# Patient Record
Sex: FEMALE | Race: WHITE | NOT HISPANIC OR LATINO | Employment: FULL TIME | URBAN - METROPOLITAN AREA
[De-identification: names, ages, dates, MRNs, and addresses within clinical notes are randomized per-mention and may not be internally consistent; named-entity substitution may affect disease eponyms.]

---

## 2023-07-07 DIAGNOSIS — N63.20 MASS OF LEFT BREAST, UNSPECIFIED QUADRANT: Primary | ICD-10-CM

## 2023-07-07 PROBLEM — N64.4 BREAST PAIN: Status: ACTIVE | Noted: 2023-07-07

## 2023-07-07 NOTE — PROGRESS NOTES
[facility]  Fred Cline MD, FACS  Zach Magana NP-C      Chief Complaint:   Deidra Mckeon is a 53 y.o. female presenting today for her annual evaluation.  She is due for mammogram. She reports no interval changes.     History of Present Illness:   Mrs. Mckeon first presented on May 15, 2017  with a left breast lump that on imaging was found to be benign and the recommendation was made to follow the area conservatively.   She was lost to follow up until September 2022 whent she returns with a left breast lump.  Imaging has been normal.  MD::: Montana Valderrama MD    Past Medical History:   Diagnosis Date    Acid reflux disease     Amenorrhea, unspecified     Breast pain 7/7/2023    Hiatal hernia     Hypothyroidism, unspecified     Joint disorder     Kidney stones     Mass of left breast 7/7/2023      Past Surgical History:   Procedure Laterality Date    DILATION AND CURETTAGE OF UTERUS      LITHOTRIPSY      TONSILLECTOMY AND ADENOIDECTOMY          Current Outpatient Medications:     ARMOUR THYROID 60 mg Tab, Take 60 mg by mouth every morning., Disp: , Rfl:     baclofen (LIORESAL) 10 MG tablet, Take 10 mg by mouth 2 (two) times daily., Disp: , Rfl:     EScitalopram oxalate (LEXAPRO) 10 MG tablet, Take 1 tablet (10 mg total) by mouth once daily., Disp: 90 tablet, Rfl: 3    ondansetron (ZOFRAN-ODT) 8 MG TbDL, DISSOLVE 1 TABLET ON THE TONGUE EVERY 12 HOURS AS NEEDED FOR NAUSEA OR VOMITING, Disp: , Rfl:     RABEprazole (ACIPHEX) 20 mg tablet, , Disp: , Rfl:    Review of patient's allergies indicates:   Allergen Reactions    Latex Hives    Latex, natural rubber     Penicillin     Penicillins       Social History     Tobacco Use    Smoking status: Never    Smokeless tobacco: Never   Substance Use Topics    Alcohol use: Never      Family History   Family history unknown: Yes        Review of Systems   Integumentary:  Negative for color change, rash, mole/lesion, breast  mass, breast discharge and breast tenderness.   Breast: Negative for mass and tenderness     Physical Exam   HENT:   Head: Normocephalic.   Pulmonary/Chest: Right breast exhibits no inverted nipple, no mass, no nipple discharge, no skin change and no tenderness. Left breast exhibits no inverted nipple, no mass, no nipple discharge, no skin change and no tenderness. No breast swelling.   Genitourinary: No breast swelling.   Musculoskeletal: Lymphadenopathy:      Upper Body:      Right upper body: No supraclavicular or axillary adenopathy.      Left upper body: No supraclavicular or axillary adenopathy.     Neurological: She is alert.      MAMMOGRAM REPORT: She has some diffuse fibronodular tissue; there are no spiculated lesions, distortions or suspicious calcifications noted; NEM    ASSESSMENT and PLAN OF CARE     1. Mass of left breast, unspecified quadrant  Assessment & Plan:  We reviewed our findings today and her questions were answered.  She understands that her imaging and exams have remained stable (and show nothing concerning).  She is comfortable being followed in a conservative fashion.      She understands the importance of monthly self-breast examination and knows to report any and all changes as they occur.    NOTE:::We viewed her films together at today's visit.  We discussed the multiple views obtained and the important findings.  Even benign changes were mentioned and her questions were answered.  She knows that she may receive a formal letter or report from the Radiologist.  She is to contact us if she has questions.        2. Breast pain  Assessment & Plan:  We discussed our fibrocystic mastopathy protocol in detail. She knows that if she follows this protocol - that her symptoms should improve.  We discussed how breast pain is usually not associated with breast cancer, however, pain can be the presenting symptom with some cancers (but this could be coincidental). Still, if her pain does not  improve in 8-12 weeks she should call us back for additional recommendations.        Medical Decision Making:  It is my impression that this patient suffers all conditions contained in this medical document.  Each of these conditions did affect our plan of care and my medical decision making today.  It is my opinion that the medical decision making concerning this patient was of minimal  difficulty based on the aforementioned conditions.  Any further recommendations will be communicated to the patient by me.  I have reviewed and verified her allergies, list of medications, medical and surgical histories, social history, and a pertinent review of symptoms.     Follow up: 1 year and PRN    For: PE and OPAL (D) at [facility]

## 2023-07-19 ENCOUNTER — OFFICE VISIT (OUTPATIENT)
Dept: SURGERY | Facility: CLINIC | Age: 53
End: 2023-07-19
Payer: COMMERCIAL

## 2023-07-19 DIAGNOSIS — N63.20 MASS OF LEFT BREAST, UNSPECIFIED QUADRANT: Primary | ICD-10-CM

## 2023-07-19 DIAGNOSIS — N64.4 BREAST PAIN: ICD-10-CM

## 2023-07-19 PROCEDURE — 1160F PR REVIEW ALL MEDS BY PRESCRIBER/CLIN PHARMACIST DOCUMENTED: ICD-10-PCS | Mod: CPTII,S$GLB,, | Performed by: SPECIALIST

## 2023-07-19 PROCEDURE — 1159F PR MEDICATION LIST DOCUMENTED IN MEDICAL RECORD: ICD-10-PCS | Mod: CPTII,S$GLB,, | Performed by: SPECIALIST

## 2023-07-19 PROCEDURE — 99213 OFFICE O/P EST LOW 20 MIN: CPT | Mod: S$GLB,,, | Performed by: SPECIALIST

## 2023-07-19 PROCEDURE — 1160F RVW MEDS BY RX/DR IN RCRD: CPT | Mod: CPTII,S$GLB,, | Performed by: SPECIALIST

## 2023-07-19 PROCEDURE — 1159F MED LIST DOCD IN RCRD: CPT | Mod: CPTII,S$GLB,, | Performed by: SPECIALIST

## 2023-07-19 PROCEDURE — 99213 PR OFFICE/OUTPT VISIT, EST, LEVL III, 20-29 MIN: ICD-10-PCS | Mod: S$GLB,,, | Performed by: SPECIALIST

## 2024-07-03 DIAGNOSIS — N63.20 MASS OF LEFT BREAST, UNSPECIFIED QUADRANT: Primary | ICD-10-CM

## 2024-07-03 NOTE — PROGRESS NOTES
Date of Service: 7/24/2024      Chief Complaint:   Deidra Mckeon is a 54 y.o. female presenting today for her annual evaluation.  She is due for a mammogram. She reports no interval changes.     History of Present Illness: Mrs. Mckeon first presented on May 15, 2017  with a left breast lump that on imaging was found to be benign and the recommendation was made to follow the area conservatively.   She was lost to follow up until September 2022 whent she returns with a left breast lump.  Imaging has been normal.  MD::: Montana Valderrama MD    Past Medical History:   Diagnosis Date    Acid reflux disease     Amenorrhea, unspecified     Breast pain 7/7/2023    Hiatal hernia     Hypothyroidism, unspecified     Joint disorder     Kidney stones     Mass of left breast 7/7/2023      Past Surgical History:   Procedure Laterality Date    DILATION AND CURETTAGE OF UTERUS      LITHOTRIPSY      TONSILLECTOMY AND ADENOIDECTOMY          Current Outpatient Medications:     ARMOUR THYROID 60 mg Tab, Take 60 mg by mouth every morning., Disp: , Rfl:     baclofen (LIORESAL) 10 MG tablet, Take 10 mg by mouth 2 (two) times daily., Disp: , Rfl:     EScitalopram oxalate (LEXAPRO) 10 MG tablet, TAKE 1 TABLET(10 MG) BY MOUTH EVERY DAY, Disp: 90 tablet, Rfl: 3    estradioL (ESTRACE) 0.5 MG tablet, Take 2 tablets (1 mg total) by mouth once daily., Disp: 60 tablet, Rfl: 11    ondansetron (ZOFRAN-ODT) 8 MG TbDL, DISSOLVE 1 TABLET ON THE TONGUE EVERY 12 HOURS AS NEEDED FOR NAUSEA OR VOMITING, Disp: 20 tablet, Rfl: 3    progesterone (PROMETRIUM) 100 MG capsule, Take 2 capsules (200 mg total) by mouth once daily., Disp: 60 capsule, Rfl: 11    RABEprazole (ACIPHEX) 20 mg tablet, , Disp: , Rfl:    Review of patient's allergies indicates:   Allergen Reactions    Latex Hives    Penicillin       Social History     Tobacco Use    Smoking status: Never    Smokeless tobacco: Never   Substance Use Topics    Alcohol use: Never      Family  History   Family history unknown: Yes        Review of Systems   Integumentary:  Negative for color change, rash, mole/lesion, thickening/swelling, dimpling of skin, drainage  Breast: Negative for mass and tenderness     Physical Exam   Constitutional: She appears well-developed. She is cooperative.   HENT: Normocephalic.   Cardiovascular:  Normal rate and regular rhythm.            Pulmonary/Chest: She exhibits no tenderness and no bony tenderness.   Abdominal: Soft. Normal appearance.   Musculoskeletal: Intact with no deficits  Neurological: She is alert.   Skin: No rash noted.   Breast and Chest Wall Evaluation:   Right breast exhibits no mass, no nipple discharge, no skin change and no tenderness.     Left breast exhibits no mass, no nipple discharge, no skin change and no tenderness.     Lymphadenopathy: No supraclavicular or axillary adenopathy.    MAMMOGRAM REPORT: She has some diffuse fibronodular tissue; there are no spiculated lesions, distortions or suspicious calcifications noted; NEM    ASSESSMENT and PLAN OF CARE     1. Mass of left breast, unspecified quadrant  Assessment & Plan:  We reviewed our findings today and her questions were answered.  She understands that her imaging and exams have remained stable (and show nothing concerning).  She is comfortable being followed in a conservative fashion.      She understands the importance of monthly self-breast examination and knows to report any and all changes as they occur.    NOTE:::We viewed her films together at today's visit.  We discussed the multiple views obtained and the important findings.  Even benign changes were mentioned and her questions were answered.  She knows that she may receive a formal letter or report from the Radiologist.  She is to contact us if she has questions.         2. Breast pain  Assessment & Plan:  We discussed our fibrocystic mastopathy protocol in detail. She knows that if she follows this protocol - that her symptoms  should improve.  We discussed how breast pain is usually not associated with breast cancer, however, pain can be the presenting symptom with some cancers (but this could be coincidental). Still, if her pain does not improve in 8-12 weeks she should call us back for additional recommendations.       Medical Decision Making: It is my impression that this patient suffers all conditions contained in this medical document.  Each of these conditions did affect our plan of care and my medical decision making today.  It is my opinion that the medical decision making concerning this patient was of minimal  difficulty based on the aforementioned conditions.  Any further recommendations will be communicated to the patient by me.  I have reviewed and verified her allergies, list of medications, medical and surgical histories, social history, and a pertinent review of symptoms.     Follow up: 1 year and PRN    For: Physical Examination and MGM (D) at [facility]

## 2024-07-24 ENCOUNTER — OFFICE VISIT (OUTPATIENT)
Dept: SURGERY | Facility: CLINIC | Age: 54
End: 2024-07-24
Payer: COMMERCIAL

## 2024-07-24 DIAGNOSIS — N63.20 MASS OF LEFT BREAST, UNSPECIFIED QUADRANT: Primary | ICD-10-CM

## 2024-07-24 DIAGNOSIS — N64.4 BREAST PAIN: ICD-10-CM

## 2024-07-24 PROCEDURE — 1160F RVW MEDS BY RX/DR IN RCRD: CPT | Mod: CPTII,S$GLB,, | Performed by: SPECIALIST

## 2024-07-24 PROCEDURE — 1159F MED LIST DOCD IN RCRD: CPT | Mod: CPTII,S$GLB,, | Performed by: SPECIALIST

## 2024-07-24 PROCEDURE — 99999 PR PBB SHADOW E&M-EST. PATIENT-LVL III: CPT | Mod: PBBFAC,,, | Performed by: SPECIALIST

## 2024-07-24 PROCEDURE — 99213 OFFICE O/P EST LOW 20 MIN: CPT | Mod: S$GLB,,, | Performed by: SPECIALIST

## 2024-07-26 PROBLEM — Z01.419 WELL WOMAN EXAM WITH ROUTINE GYNECOLOGICAL EXAM: Status: ACTIVE | Noted: 2024-07-26

## 2025-05-30 ENCOUNTER — PATIENT MESSAGE (OUTPATIENT)
Dept: SURGERY | Facility: CLINIC | Age: 55
End: 2025-05-30
Payer: COMMERCIAL